# Patient Record
Sex: FEMALE | Race: WHITE | URBAN - METROPOLITAN AREA
[De-identification: names, ages, dates, MRNs, and addresses within clinical notes are randomized per-mention and may not be internally consistent; named-entity substitution may affect disease eponyms.]

---

## 2022-03-18 ENCOUNTER — OFFICE VISIT (OUTPATIENT)
Dept: URBAN - METROPOLITAN AREA CLINIC 87 | Facility: CLINIC | Age: 72
End: 2022-03-18
Payer: MEDICARE

## 2022-03-18 DIAGNOSIS — M31.6 OTHER GIANT CELL ARTERITIS: ICD-10-CM

## 2022-03-18 DIAGNOSIS — H35.372 PUCKERING OF MACULA, LEFT EYE: Primary | ICD-10-CM

## 2022-03-18 DIAGNOSIS — H43.813 VITREOUS DEGENERATION, BILATERAL: ICD-10-CM

## 2022-03-18 DIAGNOSIS — H25.13 AGE-RELATED NUCLEAR CATARACT, BILATERAL: ICD-10-CM

## 2022-03-18 PROCEDURE — 99204 OFFICE O/P NEW MOD 45 MIN: CPT | Performed by: OPHTHALMOLOGY

## 2022-03-18 PROCEDURE — 92134 CPTRZ OPH DX IMG PST SGM RTA: CPT | Performed by: OPHTHALMOLOGY

## 2022-03-18 ASSESSMENT — INTRAOCULAR PRESSURE
OD: 11
OS: 11

## 2022-03-18 NOTE — IMPRESSION/PLAN
Impression: Other giant cell arteritis: M31.6. Plan: Pt diagnosed 2 years ago. Completed Prednisone 7/2021. On Tocilizumab infusions. Followed by Neuro-ophthalmology at [facility] as well as Rheum (Dr Raheem Martinez). Inflammatory markers in check.  

Continue management per Rheum and Neuro-oph

## 2022-03-18 NOTE — IMPRESSION/PLAN
Impression: Puckering of macula, left eye: H35.372. Left. OCT: 
OD: wnl OS: ERM Plan: Examination and OCT reveals an ERM which is distorting the macular contour. The diagnosis, natural history, and prognosis of epiretinal membranes, as well as the risks and benefits of vitrectomy with membrane peeling versus observation were discussed at length. Given the patient's current visual acuity and minimal hindrance on activities of daily living, observation was recommended at this time. 

RTC PRN

## 2022-03-18 NOTE — IMPRESSION/PLAN
Impression: Age-related nuclear cataract, bilateral: H25.13. Bilateral. Plan: Refer for cataract eval when pt is ready. Consider retina surgery after CE IOL is done.

## 2022-10-28 ENCOUNTER — OFFICE VISIT (OUTPATIENT)
Dept: URBAN - METROPOLITAN AREA CLINIC 87 | Facility: CLINIC | Age: 72
End: 2022-10-28
Payer: MEDICARE

## 2022-10-28 DIAGNOSIS — H25.13 AGE-RELATED NUCLEAR CATARACT, BILATERAL: ICD-10-CM

## 2022-10-28 DIAGNOSIS — H35.372 PUCKERING OF MACULA, LEFT EYE: Primary | ICD-10-CM

## 2022-10-28 DIAGNOSIS — H43.813 VITREOUS DEGENERATION, BILATERAL: ICD-10-CM

## 2022-10-28 DIAGNOSIS — M31.6 OTHER GIANT CELL ARTERITIS: ICD-10-CM

## 2022-10-28 PROCEDURE — 92134 CPTRZ OPH DX IMG PST SGM RTA: CPT | Performed by: OPHTHALMOLOGY

## 2022-10-28 PROCEDURE — 99214 OFFICE O/P EST MOD 30 MIN: CPT | Performed by: OPHTHALMOLOGY

## 2022-10-28 ASSESSMENT — INTRAOCULAR PRESSURE
OS: 11
OD: 12

## 2022-10-28 NOTE — IMPRESSION/PLAN
Impression: Puckering of macula, left eye: H35.372. Left. OCT: 
OD: wnl OS: ERM Plan: Examination and OCT reveals an ERM which is distorting the macular contour. The diagnosis, natural history, and prognosis of epiretinal membranes, as well as the risks and benefits of vitrectomy with membrane peeling versus observation were discussed at length. Given the patient's current visual acuity and minimal hindrance on activities of daily living, observation was recommended at this time. 

Consider PPV/MP after GCA issue is stable and cataract sx complete

## 2022-10-28 NOTE — IMPRESSION/PLAN
Impression: Other giant cell arteritis: M31.6. Plan: Pt diagnosed 2 years ago. Completed Prednisone 7/2021. Off Tocilizumab infusions. Seeing Rheum to see if she should go back on infusions. Ocular exam does not show any evidence of inflammation to suggest active ocular GCA. Discussed with patient this does not mean she cannot have systemic activity of GCA.